# Patient Record
Sex: MALE | Race: BLACK OR AFRICAN AMERICAN | NOT HISPANIC OR LATINO
[De-identification: names, ages, dates, MRNs, and addresses within clinical notes are randomized per-mention and may not be internally consistent; named-entity substitution may affect disease eponyms.]

---

## 2017-11-03 PROBLEM — Z00.00 ENCOUNTER FOR PREVENTIVE HEALTH EXAMINATION: Status: ACTIVE | Noted: 2017-11-03

## 2017-11-16 ENCOUNTER — APPOINTMENT (OUTPATIENT)
Dept: ORTHOPEDIC SURGERY | Facility: CLINIC | Age: 52
End: 2017-11-16
Payer: MEDICAID

## 2017-11-16 PROCEDURE — 99203 OFFICE O/P NEW LOW 30 MIN: CPT

## 2018-01-18 ENCOUNTER — APPOINTMENT (OUTPATIENT)
Dept: ORTHOPEDIC SURGERY | Facility: CLINIC | Age: 53
End: 2018-01-18
Payer: MEDICAID

## 2018-01-18 DIAGNOSIS — S83.241D OTHER TEAR OF MEDIAL MENISCUS, CURRENT INJURY, RIGHT KNEE, SUBSEQUENT ENCOUNTER: ICD-10-CM

## 2018-01-18 PROCEDURE — 99213 OFFICE O/P EST LOW 20 MIN: CPT

## 2023-02-18 ENCOUNTER — EMERGENCY (EMERGENCY)
Facility: HOSPITAL | Age: 58
LOS: 1 days | Discharge: HOME | End: 2023-02-18
Attending: EMERGENCY MEDICINE | Admitting: EMERGENCY MEDICINE
Payer: MEDICAID

## 2023-02-18 VITALS
WEIGHT: 138.89 LBS | OXYGEN SATURATION: 100 % | TEMPERATURE: 99 F | RESPIRATION RATE: 20 BRPM | HEIGHT: 67 IN | HEART RATE: 88 BPM | DIASTOLIC BLOOD PRESSURE: 90 MMHG | SYSTOLIC BLOOD PRESSURE: 157 MMHG

## 2023-02-18 VITALS
HEART RATE: 82 BPM | TEMPERATURE: 98 F | RESPIRATION RATE: 18 BRPM | SYSTOLIC BLOOD PRESSURE: 166 MMHG | OXYGEN SATURATION: 98 % | DIASTOLIC BLOOD PRESSURE: 88 MMHG

## 2023-02-18 LAB
A1C WITH ESTIMATED AVERAGE GLUCOSE RESULT: 5.3 % — SIGNIFICANT CHANGE UP (ref 4–5.6)
ALBUMIN SERPL ELPH-MCNC: 3.9 G/DL — SIGNIFICANT CHANGE UP (ref 3.3–5)
ALP SERPL-CCNC: 110 U/L — SIGNIFICANT CHANGE UP (ref 40–120)
ALT FLD-CCNC: 30 U/L — SIGNIFICANT CHANGE UP (ref 10–45)
ANION GAP SERPL CALC-SCNC: 10 MMOL/L — SIGNIFICANT CHANGE UP (ref 5–17)
AST SERPL-CCNC: 28 U/L — SIGNIFICANT CHANGE UP (ref 10–40)
BASOPHILS # BLD AUTO: 0.02 K/UL — SIGNIFICANT CHANGE UP (ref 0–0.2)
BASOPHILS NFR BLD AUTO: 0.7 % — SIGNIFICANT CHANGE UP (ref 0–2)
BILIRUB SERPL-MCNC: 0.3 MG/DL — SIGNIFICANT CHANGE UP (ref 0.2–1.2)
BUN SERPL-MCNC: 12 MG/DL — SIGNIFICANT CHANGE UP (ref 7–23)
CALCIUM SERPL-MCNC: 9 MG/DL — SIGNIFICANT CHANGE UP (ref 8.4–10.5)
CHLORIDE SERPL-SCNC: 104 MMOL/L — SIGNIFICANT CHANGE UP (ref 96–108)
CO2 SERPL-SCNC: 27 MMOL/L — SIGNIFICANT CHANGE UP (ref 22–31)
CREAT SERPL-MCNC: 1.07 MG/DL — SIGNIFICANT CHANGE UP (ref 0.5–1.3)
CRP SERPL-MCNC: <3 MG/L — SIGNIFICANT CHANGE UP (ref 0–4)
EGFR: 81 ML/MIN/1.73M2 — SIGNIFICANT CHANGE UP
EOSINOPHIL # BLD AUTO: 0.03 K/UL — SIGNIFICANT CHANGE UP (ref 0–0.5)
EOSINOPHIL NFR BLD AUTO: 1 % — SIGNIFICANT CHANGE UP (ref 0–6)
ESTIMATED AVERAGE GLUCOSE: 105 MG/DL — SIGNIFICANT CHANGE UP (ref 68–114)
GLUCOSE SERPL-MCNC: 133 MG/DL — HIGH (ref 70–99)
HCT VFR BLD CALC: 41.1 % — SIGNIFICANT CHANGE UP (ref 39–50)
HCV AB S/CO SERPL IA: 0.12 S/CO — SIGNIFICANT CHANGE UP
HCV AB SERPL-IMP: SIGNIFICANT CHANGE UP
HGB BLD-MCNC: 13.8 G/DL — SIGNIFICANT CHANGE UP (ref 13–17)
HIV 1+2 AB+HIV1 P24 AG SERPL QL IA: SIGNIFICANT CHANGE UP
IMM GRANULOCYTES NFR BLD AUTO: 0.3 % — SIGNIFICANT CHANGE UP (ref 0–0.9)
LYMPHOCYTES # BLD AUTO: 0.93 K/UL — LOW (ref 1–3.3)
LYMPHOCYTES # BLD AUTO: 31.5 % — SIGNIFICANT CHANGE UP (ref 13–44)
MAGNESIUM SERPL-MCNC: 1.9 MG/DL — SIGNIFICANT CHANGE UP (ref 1.6–2.6)
MCHC RBC-ENTMCNC: 32.4 PG — SIGNIFICANT CHANGE UP (ref 27–34)
MCHC RBC-ENTMCNC: 33.6 GM/DL — SIGNIFICANT CHANGE UP (ref 32–36)
MCV RBC AUTO: 96.5 FL — SIGNIFICANT CHANGE UP (ref 80–100)
MONOCYTES # BLD AUTO: 0.38 K/UL — SIGNIFICANT CHANGE UP (ref 0–0.9)
MONOCYTES NFR BLD AUTO: 12.9 % — SIGNIFICANT CHANGE UP (ref 2–14)
NEUTROPHILS # BLD AUTO: 1.58 K/UL — LOW (ref 1.8–7.4)
NEUTROPHILS NFR BLD AUTO: 53.6 % — SIGNIFICANT CHANGE UP (ref 43–77)
NRBC # BLD: 0 /100 WBCS — SIGNIFICANT CHANGE UP (ref 0–0)
PHOSPHATE SERPL-MCNC: 3.4 MG/DL — SIGNIFICANT CHANGE UP (ref 2.5–4.5)
PLATELET # BLD AUTO: 201 K/UL — SIGNIFICANT CHANGE UP (ref 150–400)
POTASSIUM SERPL-MCNC: 4.8 MMOL/L — SIGNIFICANT CHANGE UP (ref 3.5–5.3)
POTASSIUM SERPL-SCNC: 4.8 MMOL/L — SIGNIFICANT CHANGE UP (ref 3.5–5.3)
PROT SERPL-MCNC: 7.1 G/DL — SIGNIFICANT CHANGE UP (ref 6–8.3)
RBC # BLD: 4.26 M/UL — SIGNIFICANT CHANGE UP (ref 4.2–5.8)
RBC # BLD: 4.26 M/UL — SIGNIFICANT CHANGE UP (ref 4.2–5.8)
RBC # FLD: 12.9 % — SIGNIFICANT CHANGE UP (ref 10.3–14.5)
RETICS #: 46 K/UL — SIGNIFICANT CHANGE UP (ref 25–125)
RETICS/RBC NFR: 1.1 % — SIGNIFICANT CHANGE UP (ref 0.5–2.5)
SODIUM SERPL-SCNC: 141 MMOL/L — SIGNIFICANT CHANGE UP (ref 135–145)
TSH SERPL-MCNC: 0.94 UIU/ML — SIGNIFICANT CHANGE UP (ref 0.27–4.2)
WBC # BLD: 2.95 K/UL — LOW (ref 3.8–10.5)
WBC # FLD AUTO: 2.95 K/UL — LOW (ref 3.8–10.5)

## 2023-02-18 PROCEDURE — 86618 LYME DISEASE ANTIBODY: CPT

## 2023-02-18 PROCEDURE — 85045 AUTOMATED RETICULOCYTE COUNT: CPT

## 2023-02-18 PROCEDURE — 82962 GLUCOSE BLOOD TEST: CPT

## 2023-02-18 PROCEDURE — 82746 ASSAY OF FOLIC ACID SERUM: CPT

## 2023-02-18 PROCEDURE — 82607 VITAMIN B-12: CPT

## 2023-02-18 PROCEDURE — 36415 COLL VENOUS BLD VENIPUNCTURE: CPT

## 2023-02-18 PROCEDURE — 86140 C-REACTIVE PROTEIN: CPT

## 2023-02-18 PROCEDURE — 84443 ASSAY THYROID STIM HORMONE: CPT

## 2023-02-18 PROCEDURE — 99285 EMERGENCY DEPT VISIT HI MDM: CPT

## 2023-02-18 PROCEDURE — 83735 ASSAY OF MAGNESIUM: CPT

## 2023-02-18 PROCEDURE — 85025 COMPLETE CBC W/AUTO DIFF WBC: CPT

## 2023-02-18 PROCEDURE — 99284 EMERGENCY DEPT VISIT MOD MDM: CPT

## 2023-02-18 PROCEDURE — 80053 COMPREHEN METABOLIC PANEL: CPT

## 2023-02-18 PROCEDURE — 83036 HEMOGLOBIN GLYCOSYLATED A1C: CPT

## 2023-02-18 PROCEDURE — 86803 HEPATITIS C AB TEST: CPT

## 2023-02-18 PROCEDURE — 87389 HIV-1 AG W/HIV-1&-2 AB AG IA: CPT

## 2023-02-18 PROCEDURE — 86334 IMMUNOFIX E-PHORESIS SERUM: CPT

## 2023-02-18 PROCEDURE — 84100 ASSAY OF PHOSPHORUS: CPT

## 2023-02-18 PROCEDURE — 86038 ANTINUCLEAR ANTIBODIES: CPT

## 2023-02-18 NOTE — ED PROVIDER NOTE - CLINICAL SUMMARY MEDICAL DECISION MAKING FREE TEXT BOX
Right wrist drop, likely compressive  Neuro cleared  - Wrist splint (cockup wrist splint)  - Can see Dr. Nice outpatient to follow up labs, and recovery [phone] [address] [place] [address] 8, [place] [id number].

## 2023-02-18 NOTE — ED PROVIDER NOTE - OBJECTIVE STATEMENT
57-year-old male with no significant past medical history here with a right wrist drop.  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  He came home from work and fell asleep at around 9 AM, which is normal for him.  At around 11 AM he woke up to go to the bathroom and noticed that his right wrist was abnormal.  He waited to see whether his wrist would wake up but it did not improve so he took the bus to [place] for evaluation.  The patient denies any trauma and nothing unusual over the last week.  The patient denies any alcohol or drug use, denies sleeping pills, and is otherwise healthy aside from an occasional upset stomach.  The patient denies any other numbness weakness, or tingling above the elbow or anywhere else.

## 2023-02-18 NOTE — ED PROVIDER NOTE - PATIENT PORTAL LINK FT
You can access the FollowMyHealth Patient Portal offered by [facility] by registering at the following website: [email or web address]. By joining [employer]’s FollowMyHealth portal, you will also be able to view your health information using other applications (apps) compatible with our system.

## 2023-02-18 NOTE — ED PROVIDER NOTE - PHYSICAL EXAMINATION
NAD  Normal affect, AAOx3  NCAT  RRR no mrg  CTAB  Abd soft  Right wrist and fingers, no extension, sensation over radial aspect of hand decreased.    Weakness in thumb abduction

## 2023-02-18 NOTE — CONSULT NOTE ADULT - ASSESSMENT
Assessment: 57 right-handed male with no PMH presented to ED for persistent weakness of wrist since this afternoon. 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 He woke up at 1pm with R shoulder pain and the sensation that his right arm fell asleep. He was not able to grasp or move his right wrist. The wrist drop has improved only marginally today. Endorsed sporadic right neck pain near the trapezius muscle that hurts at baseline due to his shoulder bag.  He denied etoh or drugs prior to sleep, Denied recent illnesses or vaccinations, trauma to the arm or neck. CRP normal. 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     Recommendations:  - Further labs to draw prior to outpatient: Lyme, JOSE RAMON, Hep C, HIV, immunofixation  - Wrist splint (cockup wrist splint)  - Can see Dr. Nice outpatient to follow up labs, and recovery [phone] [address] [place] [address] [address], [place] [address].     Plan discussed with attending   Assessment: 57 right-handed male with no PMH presented to ED for persistent weakness of wrist since this afternoon. 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 He woke up at 1pm with R shoulder pain and the sensation that his right arm fell asleep. He was not able to grasp or move his right wrist. The wrist drop has improved only marginally today. Endorsed sporadic right neck pain near the trapezius muscle that hurts at baseline due to his shoulder bag.  He denied etoh or drugs prior to sleep, Denied recent illnesses or vaccinations, trauma to the arm or neck. CRP normal. A1C 5.3. Motor and sensory exam reflective of right radial nerve distribution.     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     Recommendations:  - Further labs to draw prior to outpatient: Lyme, JOSE RAMON, Hep C, HIV, immunofixation  - Wrist splint (cockup wrist splint)  - Can see Dr. Nice outpatient to follow up labs, and recovery [phone] [address] [place] [address] [address], [place] [address].     Plan discussed with attending   Assessment: 57 right-handed male with no PMH presented to ED for persistent weakness of wrist since this afternoon. 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 He woke up at 1pm with R shoulder pain and the sensation that his right arm fell asleep. He was not able to grasp or move his right wrist. The wrist drop has improved only marginally today. Endorsed sporadic right neck pain near the trapezius muscle that hurts at baseline due to his shoulder bag.  He denied etoh or drugs prior to sleep, Denied recent illnesses or vaccinations, trauma to the arm or neck. CRP normal. A1C 5.3. Motor and sensory exam reflective of right radial nerve distribution.     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     Recommendations:  - Further labs to draw prior to outpatient: Lyme, JOSE RAMON, Hep C, HIV, immunofixation  - Wrist splint (cockup wrist splint)  - Can see Dr. Nice outpatient to follow up labs, and recovery [phone] [address] [place] [address] [address], [place] [address].     Plan discussed with attending    Attending addendum:  Patient discharged before my evaluation.  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  suggested labs for plexopathy but the most likely cause does not require further workup or treatment at this time.  will need PT, hopefully makes lara recovery

## 2023-02-18 NOTE — ED PROVIDER NOTE - NSFOLLOWUPINSTRUCTIONS_ED_ALL_ED_FT
- Wrist splint (cockup wrist splint)  - Can see Dr. Nice outpatient to follow up labs, and recovery [phone] [address] [place] [address] [address], [place] [address].      Radial Nerve Palsy       Radial nerve palsy is the loss of function of the radial nerve in your arm or hand. The radial nerve extends from your shoulder, around the back of your upper arm, and down the outside of your lower arm. An injury to this nerve causes certain muscles and tendons in your arm and wrist not to work properly, which leads to a condition known as wrist drop. This means that you cannot extend your wrist. If you are standing with your arm stretched straight out in front of you, your wrist will bend and your hand will drop down toward the floor.    An injury to the radial nerve may also result in lost feeling (sensation) in parts of your arm.      What are the causes?    Common causes of this condition include:  •A break (fracture) of your upper or lower arm bone.      •Complications from surgery.      •Improper use of crutches. Crutches that are too long can put pressure on the radial nerve where it passes through the armpit (crutch palsy).      •Keeping your arm in a position that places prolonged pressure on the radial nerve, such as by sleeping with arms over the back of a chair (Saturday night palsy).      •Performing repetitive activities that involve rotation of your lower arm or movement of your wrist (repetitive use injury).        What increases the risk?    You are more likely to develop this condition if you:  •Play contact sports.      •Have a condition in which your body's immune system attacks your joints (rheumatoid arthritis).      •Have diabetes.      •Have an underactive thyroid (hypothyroidism).        What are the signs or symptoms?    Symptoms of this condition include:  •Inability to extend your wrist.      •Difficulty straightening your elbow and wrist.      •Numbness or tingling in the back of your arm, forearm, or hand.      •Inability to pinch.      •Muscle of the injured arm looking smaller.        How is this diagnosed?    This condition is diagnosed with a history of the injury and a physical exam. To confirm the diagnosis, you may also have tests, including:  •Ultrasound. This test show if the nerve has an injury.      •Nerve conduction studies. These tests show if the radial nerve is sending electrical signals well.      •X-rays. These may be done if your health care provider suspects that you have an injury to the bones in your arm.      •MRI. This may be used to determine the cause of your radial nerve palsy or to rule out other causes of your symptoms.        How is this treated?                  Treatment for this condition depends on the cause. It may involve:•Medicines.  •NSAIDs may be used to control pain.      •A steroid injection may be used to decrease swelling around the nerve.      •Physical and occupational therapy. This may help you:  •Regain strength in your hand and wrist.       •Maintain range of motion of your wrist and elbow.        •Splinting. Your health care provider may make one or more splints for you to wear during the day or at night to help with motion and positioning of your wrist.      •Removing pressure on the radial nerve. This is done if the condition is caused by pressure on the nerve. Using this treatment may allow the nerve to go back to normal within a few weeks or a few months.    •Surgery. Depending on the cause of your radial nerve palsy, surgery may be needed to:  •Remove pressure on the nerve (entrapment).      •Repair broken bones.      •Relocate (transfer) tendons in your lower arm to help you regain strength and mobility of your wrist.      •Transfer a nerve to the injury site to restore nerve function.          Follow these instructions at home:    If you have a splint or brace     •Wear the splint or brace as told by your health care provider. Remove it only as told by your health care provider.      • Loosen the splint or brace if your fingers tingle, become numb, or turn cold and blue.      •Keep the splint or brace clean.    •If the splint or brace is not waterproof:  •Do not let it get wet.      •Cover it with a watertight covering when you take a bath or a shower.        Managing pain, stiffness, and swelling   •If directed, put ice on the injured area:   •If you have a removable splint or brace, remove it as told by your health care provider.      •Put ice in a plastic bag.       •Place a towel between your skin and the bag.       •Leave the ice on for 20 minutes, 2–3 times a day.        •Move your fingers often to avoid stiffness and to lessen swelling.      •Raise (elevate) the injured area above the level of your heart while you are sitting or lying down.      General instructions     •Follow your health care provider's instructions about how to protect your hand and wrist.      •Protect your hand from extreme temperature injuries, such as burns and frostbite.      •Exercise your hand, wrist, and arm on a regular basis, as directed by your health care provider or therapist.      •Keep all follow-up visits as told by your health care provider. This is important.        Contact a health care provider if you:    •Have a sudden increase in pain.      •Develop new numbness or new loss of sensation in your hand.        Get help right away if you:    •Have a sudden change in your ability to move your arm, wrist, or hand.      •Notice your fingers become bluish in color or feel cold to the touch.        Summary    •Radial nerve palsy is the loss of function of the radial nerve in your arm or hand. That nerve extends from your shoulder, around the back of your upper arm, and down the outside of your lower arm.      •An injury to the radial nerve causes certain muscles and tendons in your arm and wrist not to work properly. This makes you unable to extend your wrist (a condition known as wrist drop). You may also lose feeling (sensation) in parts of your arm.      •Treatment for this condition depends on the cause. It may include removing pressure on the radial nerve, physical and occupational therapy, splinting, medicines, or surgery.      This information is not intended to replace advice given to you by your health care provider. Make sure you discuss any questions you have with your health care provider.

## 2023-02-18 NOTE — CONSULT NOTE ADULT - SUBJECTIVE AND OBJECTIVE BOX
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 He works the night shift, went to sleep around 9:30AM, slept on his right side with arm under his body. He woke up at 1pm with R shoulder pain and the sensation that his right arm fell asleep. He was not able to grasp or move his right wrist. the wrist drop has improved only marginally today. Endorsed sporadic right neck pain near the trapezius muscle that hurts at baseline due to his shoulder bag.  He denied etoh or drugs prior to sleep, Denied recent illnesses or vaccinations, trauma to the arm or neck     MEDICATIONS  Home Medications:    MEDICATIONS  (STANDING):    MEDICATIONS  (PRN):      FAMILY HISTORY:    SOCIAL HISTORY: negative for tobacco, alcohol, or ilicit drug use.    Allergies    No Known Allergies    Intolerances        GEN: NAD, pleasant, cooperative  NEURO:   MENTAL STATUS: AAOx3  LANG/SPEECH: Fluent, intact naming, repetition & comprehension  CRANIAL NERVES:  II: Pupils equal and reactive, no RAPD, normal visual field  III, IV, VI: EOM intact, no gaze preference or deviation  V: normal  VII: no facial asymmetry  VIII: normal hearing to speech  MOTOR: 5/5 LUE 5/5 proximal RUE. 3/5 right wrist extension, weak thumb abduction. 5/5 wrist flexion, interosseous, thumb opposition.   REFLEXES: 2+ biceps, 2+ BR. 2+ patellar 2+ ankle. Downgoing toes  SENSORY: 90% touch pinprick and temperature of dorsal right thumb pointer and middle finger compared to left hand.   MSK: TTP right trapezius muscle  COORD: Normal finger to nose  Gait: Deferred      LABS:                        13.8   2.95  )-----------( 201      ( 18 Feb 2023 17:50 )             41.1     02-18    141  |  104  |  12  ----------------------------<  133<H>  4.8   |  27  |  1.07    Ca    9.0      18 Feb 2023 17:50  Phos  3.4     02-18  Mg     1.9     02-18    TPro  7.1  /  Alb  3.9  /  TBili  0.3  /  DBili  x   /  AST  28  /  ALT  30  /  AlkPhos  110  02-18    Hemoglobin A1C:   Vitamin B12     CAPILLARY BLOOD GLUCOSE      POCT Blood Glucose.: 107 mg/dL (18 Feb 2023 17:25)              Microbiology:      RADIOLOGY, EKG AND ADDITIONAL TESTS: Reviewed.

## 2023-02-18 NOTE — ED ADULT NURSE NOTE - OBJECTIVE STATEMENT
.  57years male alert mental state (AOX3) received on foot.  -Allergy: N/A.  -complain of numbness.  Hx of n/a. pt started numbness of Rt hand and discomfort Rt wrist moving since today morning.  -denied chest pain, SOB, dizziness, headache, n/v/d, abdomen pain, fever.  Pt is in the bed comfortably at this time. Will continue to monitor and document any changes.

## 2023-02-19 LAB
FOLATE SERPL-MCNC: 9.9 NG/ML — SIGNIFICANT CHANGE UP
VIT B12 SERPL-MCNC: 535 PG/ML — SIGNIFICANT CHANGE UP (ref 232–1245)

## 2023-02-20 DIAGNOSIS — M21.331 WRIST DROP, RIGHT WRIST: ICD-10-CM

## 2023-02-21 LAB
B BURGDOR C6 AB SER-ACNC: NEGATIVE — SIGNIFICANT CHANGE UP
B BURGDOR IGG+IGM SER-ACNC: 0.06 INDEX — SIGNIFICANT CHANGE UP (ref 0.01–0.89)

## 2023-02-22 LAB — INTERPRETATION SERPL IFE-IMP: SIGNIFICANT CHANGE UP

## 2023-02-23 LAB
ANA PAT FLD IF-IMP: ABNORMAL
ANA TITR SER: ABNORMAL

## 2023-03-06 ENCOUNTER — LABORATORY RESULT (OUTPATIENT)
Age: 58
End: 2023-03-06

## 2023-03-06 ENCOUNTER — NON-APPOINTMENT (OUTPATIENT)
Age: 58
End: 2023-03-06

## 2023-03-06 ENCOUNTER — TRANSCRIPTION ENCOUNTER (OUTPATIENT)
Age: 58
End: 2023-03-06

## 2023-03-06 ENCOUNTER — APPOINTMENT (OUTPATIENT)
Age: 58
End: 2023-03-06
Payer: MEDICAID

## 2023-03-06 VITALS
SYSTOLIC BLOOD PRESSURE: 135 MMHG | TEMPERATURE: 97.4 F | BODY MASS INDEX: 21.5 KG/M2 | HEIGHT: 67 IN | WEIGHT: 137 LBS | OXYGEN SATURATION: 100 % | DIASTOLIC BLOOD PRESSURE: 86 MMHG | HEART RATE: 84 BPM

## 2023-03-06 PROCEDURE — 99204 OFFICE O/P NEW MOD 45 MIN: CPT

## 2023-03-06 NOTE — PHYSICAL EXAM
[FreeTextEntry1] : General: this is a pleasant patient in no acute distress\par \par HEENT conjunctiva are normal, no tenderness in head\par \par CV: normal pulses, regular rate and rhythm, no peripheral edema noted\par \par Lungs: breathing is non-labored\par \par abd: soft and non-distended\par \par MSK:\par SLR: \par NINI:\par range of motion:\par tinnels: \par spurling:\par Occipital nerve tenderness:\par \par Mental status:\par Alert and oriented to person, place and time, normal speech and comprehension\par \par Cranial Nerves:\par extra-occular movements in tact without nystagmus, normal saccades and smooth pursuit, Face symmetric and facial strength symmetric, facial sensation symmetric, \par \par Motor: normal bulk and tone throughout. no abnormal movements.  Full 5/5 strength uppers and lower extremities proximally and distally except EDC, EPL, EIP, ECR all 3/5, APB and opponens 4/5\par \par Sensory: in tact and symmetric to vibration, light tough, temperature except slight diff to LT in R radial\par \par Cerebellar: normal finger-nose-finger bilaterally\par \par Reflexes: 2+ in the upper and lower extremities and symmetric.  toes are bilaterally downgoing.\par \par Gait: stable, able to tip toe heel and tandem\par \par Stances:\par Romberg: normal

## 2023-03-06 NOTE — HISTORY OF PRESENT ILLNESS
[FreeTextEntry1] : NARESH BOOKER is a 57 year who presents with wrist drop\par \par works night shift.  went home to eat and then to take a nap and fell asleep on arm and woke up with it feeling abnormal and weak.  it didn't improve so went to the ER. \par \par since ER symptoms are improving but persist.  It is not painful but there is mild soreness. \par \par last week slept on left hand and it was numb but only for a few minutes. \par \par he has not had any therapy.\par \par has also been having a stomach issues. 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 seemed to improve but came back this year.  \par \par typically sleeps 5 to 11 or 12.  sometimes sleeps very hard. \par \par Denies diplopia, blurred vision, dysphagia, dysarthria, aphasia, bowel or bladder dysfunction, imbalance, falls, headaches. no new rashes\par

## 2023-03-06 NOTE — DATA REVIEWED
[de-identified] : JOSE RAMON 1:320 speckled, WBC 2.95 otherwise CBC okay\par lyme neg, hep c neg, CMP ok, b12 535, A1C 5.3, immunofixation, HIV, Hep C normal

## 2023-03-07 LAB
ANA PAT FLD IF-IMP: ABNORMAL
ANACR T: ABNORMAL
CRP SERPL-MCNC: <3 MG/L
ERYTHROCYTE [SEDIMENTATION RATE] IN BLOOD BY WESTERGREN METHOD: 3 MM/HR

## 2023-03-09 LAB
ASIALO-GM1 ANTIBODIES, IGG/IGM: 20 IV
GD1A ANTIBODIES, IGG/IGM: NORMAL IV
GD1B ANTIBODIES, IGG/IGM: 17 IV
GM1 ANTIBODIES, IGG/IGM: 11 IV
GM2 ANTIBODIES, IGG/IGM: 13 IV
GQ1B ANTIBODIES, IGG/IGM: 17 IV

## 2023-05-03 ENCOUNTER — APPOINTMENT (OUTPATIENT)
Age: 58
End: 2023-05-03
Payer: MEDICAID

## 2023-05-03 DIAGNOSIS — G54.0 BRACHIAL PLEXUS DISORDERS: ICD-10-CM

## 2023-05-03 PROCEDURE — 95910 NRV CNDJ TEST 7-8 STUDIES: CPT

## 2023-05-03 PROCEDURE — 95886 MUSC TEST DONE W/N TEST COMP: CPT

## 2023-05-03 PROCEDURE — 99213 OFFICE O/P EST LOW 20 MIN: CPT

## 2023-05-03 NOTE — PHYSICAL EXAM
[FreeTextEntry1] : General: this is a pleasant patient in no acute distress\par \par HEENT conjunctiva are normal, no tenderness in head\par \par CV: normal pulses, regular rate and rhythm, no peripheral edema noted\par \par Lungs: breathing is non-labored\par \par abd: soft and non-distended\par \par MSK:\par SLR: \par NINI:\par range of motion:\par tinnels: \par spurling:\par Occipital nerve tenderness:\par \par Mental status:\par Alert and oriented to person, place and time, normal speech and comprehension\par \par Cranial Nerves:\par extra-occular movements in tact without nystagmus, normal saccades and smooth pursuit, Face symmetric and facial strength symmetric, facial sensation symmetric, \par \par Motor: normal bulk and tone throughout. no abnormal movements.  Full 5/5 strength uppers and lower extremities proximally and distally today I cannot detect weakness\par \par Sensory: in tact and symmetric to vibration, light tough, temperature except slight diff to LT in R radial\par \par Cerebellar: normal finger-nose-finger bilaterally\par \par Reflexes: 2+ in the upper and lower extremities and symmetric.  toes are bilaterally downgoing.\par \par Gait: stable, able to tip toe heel and tandem\par \par Stances:\par Romberg: normal

## 2023-05-03 NOTE — ASSESSMENT
[FreeTextEntry1] : EMG/NCS of right arm showed mild right brachial plexopathy with possible superimposed mild right carpal tunnel syndrome.\par \par EMG REPORT WILL BE UPLOADED SEPARATELY AS A PDF

## 2023-05-03 NOTE — HISTORY OF PRESENT ILLNESS
[FreeTextEntry1] : NARESH BOOKER is a 57 year who returns to follow up for weakness in the right hand and arm\par \par last visit was 3/6/2023\par \par since last visit he has been slowly improving. better sensation, better strength. \par \par \par

## 2024-07-18 ENCOUNTER — NON-APPOINTMENT (OUTPATIENT)
Age: 59
End: 2024-07-18

## 2024-07-25 ENCOUNTER — NON-APPOINTMENT (OUTPATIENT)
Age: 59
End: 2024-07-25

## 2024-07-26 ENCOUNTER — NON-APPOINTMENT (OUTPATIENT)
Age: 59
End: 2024-07-26

## 2024-07-26 ENCOUNTER — APPOINTMENT (OUTPATIENT)
Dept: UROLOGY | Facility: CLINIC | Age: 59
End: 2024-07-26
Payer: MEDICAID

## 2024-07-26 VITALS
BODY MASS INDEX: 25.11 KG/M2 | TEMPERATURE: 98 F | HEIGHT: 67 IN | SYSTOLIC BLOOD PRESSURE: 121 MMHG | DIASTOLIC BLOOD PRESSURE: 56 MMHG | WEIGHT: 160 LBS | HEART RATE: 82 BPM

## 2024-07-26 DIAGNOSIS — R35.1 BENIGN PROSTATIC HYPERPLASIA WITH LOWER URINARY TRACT SYMPMS: ICD-10-CM

## 2024-07-26 DIAGNOSIS — N40.1 BENIGN PROSTATIC HYPERPLASIA WITH LOWER URINARY TRACT SYMPMS: ICD-10-CM

## 2024-07-26 PROCEDURE — 99203 OFFICE O/P NEW LOW 30 MIN: CPT

## 2024-07-26 PROCEDURE — G2211 COMPLEX E/M VISIT ADD ON: CPT | Mod: NC

## 2024-07-26 NOTE — HISTORY OF PRESENT ILLNESS
[FreeTextEntry1] : 58M generally healthy  no history prostate cnacer occasional dysuria referred with low free PSA total PSA is 1.8 free PSA 17%

## 2024-07-26 NOTE — LETTER BODY
[FreeTextEntry2] : Ruby Louie, [place] [address] [place], [place] [address] [FreeTextEntry1] : Dear Ms. Louie:  I had the pleasure of seeing your patient,  NARESH BOOKER, a 58 year old man, in the office in consultation today. Please see the attached note for full details.  Thank you very much for allowing me to participate in the care of this patient. If you have any questions please feel free to call me at any time.    Sincerely yours,    Сергей Mustafa MD, KHARI Director, Male Fertility and Microsurgery  of [facility]

## 2024-07-26 NOTE — ASSESSMENT
[FreeTextEntry1] : BPH normal total PSA benign exam free PSA is irrelevant when total PSA <4 reassured UA UCX f/u 1 year

## 2024-08-01 LAB
APPEARANCE: CLEAR
BACTERIA: NEGATIVE /HPF
BILIRUBIN URINE: NEGATIVE
BLOOD URINE: NEGATIVE
CAST: 0 /LPF
COLOR: YELLOW
EPITHELIAL CELLS: 0 /HPF
GLUCOSE QUALITATIVE U: NEGATIVE MG/DL
KETONES URINE: NEGATIVE MG/DL
LEUKOCYTE ESTERASE URINE: NEGATIVE
MICROSCOPIC-UA: NORMAL
NITRITE URINE: NEGATIVE
PH URINE: 7
PROTEIN URINE: NEGATIVE MG/DL
RED BLOOD CELLS URINE: 0 /HPF
SPECIFIC GRAVITY URINE: 1.01
UROBILINOGEN URINE: 0.2 MG/DL
WHITE BLOOD CELLS URINE: 0 /HPF

## 2024-08-02 LAB — BACTERIA UR CULT: NORMAL

## 2024-09-18 ENCOUNTER — NON-APPOINTMENT (OUTPATIENT)
Age: 59
End: 2024-09-18

## 2024-09-19 ENCOUNTER — APPOINTMENT (OUTPATIENT)
Dept: OTOLARYNGOLOGY | Facility: CLINIC | Age: 59
End: 2024-09-19
Payer: MEDICAID

## 2024-09-19 VITALS — WEIGHT: 165 LBS | BODY MASS INDEX: 25.9 KG/M2 | HEIGHT: 67 IN

## 2024-09-19 DIAGNOSIS — J31.1 CHRONIC NASOPHARYNGITIS: ICD-10-CM

## 2024-09-19 DIAGNOSIS — J34.89 OTHER SPECIFIED DISORDERS OF NOSE AND NASAL SINUSES: ICD-10-CM

## 2024-09-19 PROCEDURE — 31575 DIAGNOSTIC LARYNGOSCOPY: CPT

## 2024-09-19 PROCEDURE — 99204 OFFICE O/P NEW MOD 45 MIN: CPT | Mod: 25

## 2024-09-19 NOTE — HISTORY OF PRESENT ILLNESS
[de-identified] : 59M here for initial evaluation.  For the past few months he c/o 'deep sinus' pain inside his nose that can radiate to his right side of his throat and ear. Initially it was intermittent but worsened and he then developed some bloody mucus. This then resolved and his sx improved and are only mild but persist. There is no congestion, change in olfaction, vision changes. No weight loss. No otorrhea. No tobacco, no etoh.  ROS otherwise unremarkable.

## 2024-09-19 NOTE — ASSESSMENT
[FreeTextEntry1] : 59M here for initial evaluation. For the past few months he c/o 'deep sinus' pain inside his nose that can radiate to his right side of his throat and ear. Initially it was intermittent but worsened and he then developed some bloody mucus. This then resolved and his sx improved and are only mild but persist. There is no congestion, change in olfaction or vision changes; there is no weight loss. On exam, nasal endoscopy shows mild nasopharyngeal cobblestoning. The rest of the head and neck exam, including fiberoptic laryngoscopy, is unremarkable.  Right nose/ear/throat pain with unremarkable exam with no masses/lesions - there is just a mild nasopharyngitis. For now, will start nasal steroids and keep sx diary. RTO 3 months if sx persist or sooner if worsen.

## 2024-09-19 NOTE — PHYSICAL EXAM
[de-identified] : soft, flat [de-identified] : eac clear, tm intact, me clear [Nasal Endoscopy Performed] : nasal endoscopy was performed, see procedure section for findings [Midline] : trachea located in midline position [Normal] : no rashes

## 2024-09-19 NOTE — PROCEDURE
[FreeTextEntry3] : Nasal Endoscopy: nasal airway widely patent no masses/lesions mild nasopharyngeal cobblestoning no masses/lesions  Fiberoptic Laryngoscopy: upper airway widely patent no masses/lesions TVF fully mobile

## 2025-03-27 NOTE — ED ADULT NURSE NOTE - NSFALLRSKASSESSTYPE_ED_ALL_ED
Subjective   Patient ID: Maxx White is a 13 y.o. male who presents for Nausea.  HPI Started Monday has felt nauseous all week. Threw up a couple times.      Review of Systems    Objective   Physical Exam  Vitals reviewed.   HENT:      Head: Normocephalic.      Right Ear: Tympanic membrane normal.      Left Ear: Tympanic membrane normal.      Nose: Nose normal.      Mouth/Throat:      Mouth: Mucous membranes are moist.      Pharynx: Oropharynx is clear.   Eyes:      Conjunctiva/sclera: Conjunctivae normal.   Cardiovascular:      Rate and Rhythm: Normal rate and regular rhythm.   Pulmonary:      Effort: Pulmonary effort is normal.      Breath sounds: Normal breath sounds.   Abdominal:      General: Abdomen is flat.      Palpations: Abdomen is soft.   Musculoskeletal:      Cervical back: Normal range of motion and neck supple.   Skin:     General: Skin is warm and dry.   Neurological:      Mental Status: He is alert.         Assessment/Plan   Diagnoses and all orders for this visit:  Acute gastroenteritis  Keep working on fluids  Call if not better in 3 days         LUCIA GILMORE 03/27/25 2:23 PM    Initial (On Arrival)

## 2025-07-25 ENCOUNTER — APPOINTMENT (OUTPATIENT)
Dept: UROLOGY | Facility: CLINIC | Age: 60
End: 2025-07-25
Payer: MEDICAID

## 2025-07-25 VITALS
WEIGHT: 160 LBS | HEIGHT: 67 IN | DIASTOLIC BLOOD PRESSURE: 85 MMHG | TEMPERATURE: 97.2 F | SYSTOLIC BLOOD PRESSURE: 142 MMHG | BODY MASS INDEX: 25.11 KG/M2 | HEART RATE: 89 BPM

## 2025-07-25 DIAGNOSIS — R35.1 BENIGN PROSTATIC HYPERPLASIA WITH LOWER URINARY TRACT SYMPMS: ICD-10-CM

## 2025-07-25 DIAGNOSIS — N40.1 BENIGN PROSTATIC HYPERPLASIA WITH LOWER URINARY TRACT SYMPMS: ICD-10-CM

## 2025-07-25 PROCEDURE — 99213 OFFICE O/P EST LOW 20 MIN: CPT

## 2025-07-28 LAB
APPEARANCE: CLEAR
BACTERIA UR CULT: NORMAL
BACTERIA: NEGATIVE /HPF
BILIRUBIN URINE: NEGATIVE
BLOOD URINE: NEGATIVE
CAST: 0 /LPF
COLOR: YELLOW
EPITHELIAL CELLS: 0 /HPF
GLUCOSE QUALITATIVE U: NEGATIVE MG/DL
KETONES URINE: ABNORMAL MG/DL
LEUKOCYTE ESTERASE URINE: NEGATIVE
MICROSCOPIC-UA: NORMAL
NITRITE URINE: NEGATIVE
PH URINE: 6.5
PROTEIN URINE: NORMAL MG/DL
PSA SERPL-MCNC: 1.62 NG/ML
RED BLOOD CELLS URINE: 1 /HPF
SPECIFIC GRAVITY URINE: 1.02
UROBILINOGEN URINE: 1 MG/DL
WHITE BLOOD CELLS URINE: 0 /HPF